# Patient Record
Sex: FEMALE | Race: WHITE
[De-identification: names, ages, dates, MRNs, and addresses within clinical notes are randomized per-mention and may not be internally consistent; named-entity substitution may affect disease eponyms.]

---

## 2019-07-29 ENCOUNTER — HOSPITAL ENCOUNTER (EMERGENCY)
Dept: HOSPITAL 91 - FTE | Age: 49
Discharge: HOME | End: 2019-07-29
Payer: MEDICAID

## 2019-07-29 ENCOUNTER — HOSPITAL ENCOUNTER (EMERGENCY)
Dept: HOSPITAL 10 - FTE | Age: 49
Discharge: HOME | End: 2019-07-29
Payer: MEDICAID

## 2019-07-29 VITALS
BODY MASS INDEX: 21.87 KG/M2 | WEIGHT: 128.09 LBS | BODY MASS INDEX: 21.87 KG/M2 | HEIGHT: 64 IN | WEIGHT: 128.09 LBS | HEIGHT: 64 IN

## 2019-07-29 VITALS — DIASTOLIC BLOOD PRESSURE: 70 MMHG | SYSTOLIC BLOOD PRESSURE: 135 MMHG | HEART RATE: 78 BPM | RESPIRATION RATE: 15 BRPM

## 2019-07-29 DIAGNOSIS — R21: Primary | ICD-10-CM

## 2019-07-29 PROCEDURE — 99282 EMERGENCY DEPT VISIT SF MDM: CPT

## 2019-07-29 NOTE — ERD
ER Documentation


Chief Complaint


Chief Complaint





RASH ON FACE X 6 DAYS





HPI


49-year-old female presenting with a rash on her face for the last 6 days.  


Patient has been using Purell on her face to alleviate the itching but is 


causing it to worsen.  She is never had this before and denies any fevers.  


Denies cough or runny nose.  Denies any troubles breathing or facial swelling.  


States her rash is only on her face and chin is very pruritic.  Denies any pain.


 Denies medical problems.  NKDA.  Surgical history .  Social history 


denies





ROS


All systems reviewed and are negative except as per history of present illness.





Medications


Home Meds


Active Scripts


Hydrocortisone* Topical (Hydrocortisone* Topical) 2.5%-28.3 Gm Cream..g., 1 


APPLIC TOP BID, #1 TUB


   Prov:ROMA BURRELL PA-C         19


Diphenhydramine Hcl* (Benadryl*) 25 Mg Cap, 25 MG PO Q6, #30 CAP


   Prov:ROMA BURRELL PA-C         19





Allergies


Allergies:  


Coded Allergies:  


     No Known Allergy (Unverified , 19)





PMhx/Soc


Medical and Surgical Hx:  pt denies Medical Hx, pt denies Surgical Hx


Hx Alcohol Use:  No


Hx Substance Use:  No


Hx Tobacco Use:  No





FmHx


Family History:  No diabetes, No coronary disease, No other





Physical Exam


Vitals





Vital Signs


  Date      Temp  Pulse  Resp  B/P (MAP)   Pulse Ox  O2          O2 Flow    FiO2


Time                                                 Delivery    Rate


   19  97.7     78    15      135/70        98


     06:59                           (91)





Physical Exam


GENERAL: The patient is well-appearing, well-nourished, in no acute distress


HEENT: Atraumatic.  Conjunctivae are pink.  Pupils equal, round, and reactive to


light.  There is no scleral icterus.  Tympanic membranes clear bilaterally.  


Oropharynx clear. 


CHEST: Clear to auscultation bilaterally.  There are no rales, wheezes or 


rhonchi.


HEART: Regular rate and rhythm.  No murmurs, clicks, rubs or gallops


SKIN: Erythematous wheals noted on face with no vesicles or pustules.  No 


excoriations.  No induration





Procedures/MDM


MDM: 49-year-old female presenting with rash on face.  Patient likely has 


contact dermatitis that has been worsened with use of alcohol on her face for 


the last 6 days.  Patient will be treated with supportive medications and 


recommended to avoid using Purell.  Patient is discharged and recommended to 


follow-up with primary care.  Patient is discharged with strict ER precautions. 


All questions answered at discharge





Departure


Diagnosis:  


   Primary Impression:  


   Rash


Condition:  Stable


Patient Instructions:  Self-Care for Skin Rashes


Referrals:  


[facility]


YOU HAVE RECEIVED A MEDICAL SCREENING EXAM AND THE RESULTS INDICATE THAT YOU DO 


NOT HAVE A CONDITION THAT REQUIRES URGENT TREATMENT IN THE EMERGENCY DEPARTMENT.





FURTHER EVALUATION AND TREATMENT OF YOUR CONDITION CAN WAIT UNTIL YOU ARE SEEN 


IN YOUR DOCTORS OFFICE WITHIN THE NEXT 1-2 DAYS. IT IS YOUR RESPONSIBILITY TO 


MAKE AN APPOINTMENT FOR FOLOW-UP CARE.





IF YOU HAVE A PRIMARY DOCTOR


--you should call your primary doctor and schedule an appointment





IF YOU DO NOT HAVE A PRIMARY DOCTOR YOU CAN CALL OUR PHYSICIAN REFERRAL HOTLINE 


AT


 [phone] 





IF YOU CAN NOT AFFORD TO SEE A PHYSICIAN YOU CAN CHOSE FROM THE FOLLOWING 


[facility]





[facility] [phone] [address] [facility] [phone] [address] [facility] [phone] [address] [facility] [phone] [address] [facility] [phone] [address] [facility]. [facility]. [phone]


[address]





Additional Instructions:  


FOLLOW UP WITH YOUR PRIMARY CARE PHYSICIAN TOMORROW.Return to this facility if 


you are not improving as expected.











ROMA BURRELL PA-C       2019 08:55

## 2019-08-02 ENCOUNTER — HOSPITAL ENCOUNTER (EMERGENCY)
Dept: HOSPITAL 10 - E/R | Age: 49
Discharge: HOME | End: 2019-08-02
Payer: MEDICAID

## 2019-08-02 ENCOUNTER — HOSPITAL ENCOUNTER (EMERGENCY)
Dept: HOSPITAL 91 - E/R | Age: 49
Discharge: HOME | End: 2019-08-02
Payer: COMMERCIAL

## 2019-08-02 VITALS
HEIGHT: 62 IN | HEIGHT: 62 IN | WEIGHT: 136.69 LBS | WEIGHT: 136.69 LBS | BODY MASS INDEX: 25.15 KG/M2 | BODY MASS INDEX: 25.15 KG/M2

## 2019-08-02 VITALS — DIASTOLIC BLOOD PRESSURE: 63 MMHG | HEART RATE: 87 BPM | SYSTOLIC BLOOD PRESSURE: 138 MMHG | RESPIRATION RATE: 18 BRPM

## 2019-08-02 DIAGNOSIS — R21: Primary | ICD-10-CM

## 2019-08-02 PROCEDURE — 99283 EMERGENCY DEPT VISIT LOW MDM: CPT

## 2019-08-02 NOTE — ERD
ER Documentation


Chief Complaint


Chief Complaint





RASH,ITCHING





HPI


49-year-old female is here with itchy rash.  She was seen here on Monday for the


same and given Benadryl and topical steroids which she states did not help.  She


thinks is secondary to seasonal allergies when she goes outside it is worse.  


She has no lip or tongue swelling.  No difficulty breathing.  No fevers.  No 


chest pain palpitations or shortness of breath.





ROS


All systems reviewed and are negative except as per history of present illness.





Medications


Home Meds


Active Scripts


Loratadine* (Claritin*) 10 Mg Capsule, 10 MG PO DAILY, #20 CAP


   Prov:HARMONY OSBORNE PA-C         8/2/19


Prednisone* (Prednisone*) 20 Mg Tab, 60 MG PO DAILY for 5 Days, TAB


   Prov:HARMONY OSBORNE PA-C         8/2/19


Hydrocortisone* Topical (Hydrocortisone* Topical) 2.5%-28.3 Gm Cream..g., 1 


APPLIC TOP BID, #1 TUB


   Prov:ROMA BURRELL PA-C         7/29/19


Diphenhydramine Hcl* (Benadryl*) 25 Mg Cap, 25 MG PO Q6, #30 CAP


   Prov:ROMA BURRELL PA-C         7/29/19





Allergies


Allergies:  


Coded Allergies:  


     No Known Allergy (Unverified , 7/29/19)





PMhx/Soc


Hx Alcohol Use:  No


Hx Substance Use:  No


Hx Tobacco Use:  No





FmHx


Family History:  No diabetes





Physical Exam


Vitals





Vital Signs


  Date      Temp  Pulse  Resp  B/P (MAP)   Pulse Ox  O2          O2 Flow    FiO2


Time                                                 Delivery    Rate


    8/2/19  97.7     87    18      138/63        99


     11:42                           (88)





Physical Exam


Const:   No acute distress


Head:   Atraumatic 


Eyes:    Normal Conjunctiva


ENT:    Normal External Ears, Nose and Mouth.  No lip or tongue swelling


Neck:               Full range of motion. No meningismus.


Resp:   Clear to auscultation bilaterally


Cardio:   Regular rate and rhythm, no murmurs


 


Skin:   Scant hives on anterior neck wall and small flesh-colored bumps on her 


cheeks





Procedures/MDM


Here for rash.  Does appear allergic in nature.  Prescription for Claritin given


as well as short course of prednisone.  Patient counseled regarding my di


agnostic impression and care plan. Prior to discharge all questions answered. Pt


agrees with treatment plan and understands strict return precautions. Pt is 


instructed to follow up with primary care provider within 24-48 hours. 


Precautionary instructions provided including instructions to return to the ER 


if not improving or for any worsening or changing symptoms or concerns.





Departure


Diagnosis:  


   Primary Impression:  


   Rash


Condition:  Stable


Patient Instructions:  Self-Care for Skin Rashes





Additional Instructions:  


Llame al doctor MAANA y ben beto MARY PARA DENTRO DE 1-2 BLOCK.Dgale a la 


secretaria que nosotros le instruimos hacer esta mary.Avise o llame si reeder 


condicin se empeora antes de la mary. Regresa aqui si peor o no mejor.











HARMONY OSBORNE PA-C             Aug 2, 2019 12:32

## 2019-08-07 ENCOUNTER — HOSPITAL ENCOUNTER (EMERGENCY)
Dept: HOSPITAL 91 - FTE | Age: 49
Discharge: HOME | End: 2019-08-07
Payer: COMMERCIAL

## 2019-08-07 ENCOUNTER — HOSPITAL ENCOUNTER (EMERGENCY)
Dept: HOSPITAL 10 - FTE | Age: 49
Discharge: HOME | End: 2019-08-07
Payer: COMMERCIAL

## 2019-08-07 VITALS — HEART RATE: 98 BPM | SYSTOLIC BLOOD PRESSURE: 150 MMHG | RESPIRATION RATE: 18 BRPM | DIASTOLIC BLOOD PRESSURE: 84 MMHG

## 2019-08-07 VITALS — WEIGHT: 110.23 LBS | BODY MASS INDEX: 25.51 KG/M2 | HEIGHT: 55 IN

## 2019-08-07 DIAGNOSIS — J30.2: ICD-10-CM

## 2019-08-07 DIAGNOSIS — J02.9: Primary | ICD-10-CM

## 2019-08-07 PROCEDURE — 99284 EMERGENCY DEPT VISIT MOD MDM: CPT

## 2019-08-07 PROCEDURE — 96372 THER/PROPH/DIAG INJ SC/IM: CPT

## 2019-08-07 RX ADMIN — DEXAMETHASONE SODIUM PHOSPHATE 1 MG: 10 INJECTION, SOLUTION INTRAMUSCULAR; INTRAVENOUS at 07:52

## 2019-08-07 RX ADMIN — IBUPROFEN 1 MG: 800 TABLET, FILM COATED ORAL at 07:52

## 2019-08-07 NOTE — ERD
ER Documentation


Chief Complaint


Chief Complaint





sore throat





HPI


49-year-old female presenting with a sore throat and tactile fevers.  Patient 


took Tylenol yesterday but no medications today.  She has some mild sore throat 


with no runny nose and no cough.  Occasional ear pain.  No vomiting and no 


nausea.  She has decreased appetite.  Denies any other medical problems.  NKDA. 


Surgical history .  Social history denies





ROS


All systems reviewed and are negative except as per history of present illness.





Medications


Home Meds


Active Scripts


Acetaminophen* (Tylophen*) 500 Mg Capsule, 2 CAP PO Q8H PRN for PAIN AND OR 


ELEVATED TEMP, #20 CAP


   Prov:ROMA BURRELL PA-C         19


Ibuprofen* (Motrin*) 800 Mg Tab, 800 MG PO Q6, #30 TAB


   Prov:ROMA BURRELL PA-C         19


Levocetirizine Dihydrochloride (Xyzal) 5 Mg Tablet, 5 MG PO QPM, #10 TAB


   Prov:ROMA BURRELL PA-C         19


Hydrocortisone* Topical (Hydrocortisone* Topical) 2.5%-28.3 Gm Cream..g., 1 


APPLIC TOP BID, #1 TUB


   Prov:ROMA BURRELL PA-C         19


Loratadine* (Claritin*) 10 Mg Capsule, 10 MG PO DAILY, #20 CAP


   Prov:HARMONY OSBORNE PA-C         19


Prednisone* (Prednisone*) 20 Mg Tab, 60 MG PO DAILY for 5 Days, TAB


   Prov:HARMONY OSBORNE PA-C         19


Hydrocortisone* Topical (Hydrocortisone* Topical) 2.5%-28.3 Gm Cream..g., 1 


APPLIC TOP BID, #1 TUB


   Prov:ROMA BURRELL PA-C         19


Diphenhydramine Hcl* (Benadryl*) 25 Mg Cap, 25 MG PO Q6, #30 CAP


   Prov:ROMA BURRELL PA-C         19





Allergies


Allergies:  


Coded Allergies:  


     No Known Allergy (Unverified , 19)





PMhx/Soc


Medical and Surgical Hx:  pt denies Medical Hx, pt denies Surgical Hx


Hx Alcohol Use:  No


Hx Substance Use:  No


Hx Tobacco Use:  No


Smoking Status:  Never smoker





FmHx


Family History:  No diabetes, No coronary disease, No other





Physical Exam


Vitals





Vital Signs


  Date      Temp  Pulse  Resp  B/P (MAP)   Pulse Ox  O2          O2 Flow    FiO2


Time                                                 Delivery    Rate


    19  98.8     98    18      150/84        99


     07:34                          (106)





Physical Exam


GENERAL: The patient is well-appearing, well-nourished, in no acute distress


HEENT: Atraumatic.  Conjunctivae are pink.  Pupils equal, round, and reactive to


light.  There is no scleral icterus.  Tympanic membranes clear bilaterally.  


Oropharynx clear.  


NECK: C-spine is soft and supple.  There is no meningismus.  There is no c


ervical lymphadenopathy. 


CHEST: Clear to auscultation bilaterally.  There are no rales, wheezes or 


rhonchi.


HEART: Regular rate and rhythm.  No murmurs, clicks, rubs or gallops.  No S3 or 


S4.


Results 24 hrs





Current Medications


 Medications
   Dose
          Sig/Tanya
       Start Time
   Status  Last


 (Trade)       Ordered        Route
 PRN     Stop Time              Admin
Dose


                              Reason                                Admin


                10 mg          ONCE  ONCE
    19        DC            19


Dexamethasone                 IM
            08:00
 19                07:52




  (Decadron)                                08:01


 Ibuprofen
     800 mg         ONCE  ONCE
    19        DC            19


(Motrin)                      PO
            08:00
 19                07:52



                                             08:01








Procedures/MDM


ER course: Decadron and Tylenol given in the ED.





MDM: 49-year-old female presenting with URI symptoms.  Patient likely has 


seasonal allergies causing her to have nasal drip and sore throat.  I have low 


suspicion for intracranial hemorrhage or neuro deficit.  I have low suspicion 


for bacterial infection I do not feel antibiotics are indicated.  Patient is 


discharged with strict ER precautions and told to follow-up with primary care 


within 1 to 2 days for close evaluation.  Patient is told symptoms change or 


worsen to return immediately to the ER.  All questions answered at discharge





Departure


Diagnosis:  


   Primary Impression:  


   Seasonal allergies


   Additional Impression:  


   Sore throat


Condition:  Stable


Patient Instructions:  Self-Care for Sore Throats, Seasonal Allergy





Additional Instructions:  


FOLLOW UP WITH YOUR PRIMARY CARE PHYSICIAN TOMORROW.Return to this facility if 


you are not improving as expected.











ROMA BURRELL PA-C        Aug 7, 2019 15:44